# Patient Record
Sex: FEMALE
[De-identification: names, ages, dates, MRNs, and addresses within clinical notes are randomized per-mention and may not be internally consistent; named-entity substitution may affect disease eponyms.]

---

## 2021-09-14 ENCOUNTER — APPOINTMENT (OUTPATIENT)
Dept: ENDOCRINOLOGY | Facility: CLINIC | Age: 41
End: 2021-09-14
Payer: COMMERCIAL

## 2021-09-14 VITALS
HEIGHT: 60 IN | OXYGEN SATURATION: 98 % | SYSTOLIC BLOOD PRESSURE: 94 MMHG | BODY MASS INDEX: 25.32 KG/M2 | DIASTOLIC BLOOD PRESSURE: 52 MMHG | WEIGHT: 129 LBS | HEART RATE: 76 BPM

## 2021-09-14 DIAGNOSIS — Z00.00 ENCOUNTER FOR GENERAL ADULT MEDICAL EXAMINATION W/OUT ABNORMAL FINDINGS: ICD-10-CM

## 2021-09-14 DIAGNOSIS — Z78.9 OTHER SPECIFIED HEALTH STATUS: ICD-10-CM

## 2021-09-14 DIAGNOSIS — Z34.91 ENCOUNTER FOR SUPERVISION OF NORMAL PREGNANCY, UNSPECIFIED, FIRST TRIMESTER: ICD-10-CM

## 2021-09-14 DIAGNOSIS — R94.6 ABNORMAL RESULTS OF THYROID FUNCTION STUDIES: ICD-10-CM

## 2021-09-14 PROCEDURE — 99204 OFFICE O/P NEW MOD 45 MIN: CPT

## 2021-09-20 ENCOUNTER — TRANSCRIPTION ENCOUNTER (OUTPATIENT)
Age: 41
End: 2021-09-20

## 2021-09-26 PROBLEM — Z78.9 NON-SMOKER: Status: RESOLVED | Noted: 2021-09-26 | Resolved: 2021-09-26

## 2021-09-26 RX ORDER — PNV/FERROUS SULFATE/FOLIC ACID 27-<0.5MG
TABLET ORAL
Refills: 0 | Status: ACTIVE | COMMUNITY

## 2021-09-26 NOTE — HISTORY OF PRESENT ILLNESS
[FreeTextEntry1] : Pt here for eval of abnormal TFT\par 11 weeks pregnant \par \par pt has danny ahving some nausea \par \par No h/o thyroid disorder  \par EDC April 5 2022 \par \par \par 1st pregnancy    all ok overall \par \par \par PMh  \par  no asthma  \par NO HTN \par  No PreDM \par \par \par PSH  Csection  \par  \par \par FH no thryoid  disease \par \par \par \par All NKDA \par \par  Soc Hx  born[place] in [place]     No chem NO XRT \par Non smoking No alcohol

## 2021-09-26 NOTE — ASSESSMENT
[FreeTextEntry1] : Abnormal TFT  will check repeat TFT with thryoid AB including TSI \par 11 weeks pregnant \par \par

## 2021-10-08 ENCOUNTER — APPOINTMENT (OUTPATIENT)
Dept: ENDOCRINOLOGY | Facility: CLINIC | Age: 41
End: 2021-10-08
Payer: COMMERCIAL

## 2021-10-08 PROCEDURE — 99213 OFFICE O/P EST LOW 20 MIN: CPT | Mod: 95

## 2021-10-29 NOTE — HISTORY OF PRESENT ILLNESS
[Home] : at home, [unfilled] , at the time of the visit. [Other Location: e.g. Home (Enter Location, City,State)___] : at [unfilled] [Verbal consent obtained from patient] : the patient, [unfilled] [FreeTextEntry1] : TEB   start time 441 pm end time 451 pm \par follow up abnl TFT now normalizinf \par 15 weeks pregnant \par \par pt has danny ahving some nausea \par \par No h/o thyroid disorder  \par EDC April 5 2022 \par \par \par 1st pregnancy    all ok overall \par \par \par PMh  \par  no asthma  \par NO HTN \par  No PreDM \par \par \par PSH  Csection  \par  \par \par FH no thryoid  disease \par \par \par \par All NKDA \par \par  Soc Hx  born[place]     No chem NO XRT \par Non smoking No alcohol

## 2021-10-29 NOTE — ASSESSMENT
[FreeTextEntry1] : Abnormal TFT  will check repeat TFT with thryoid AB including TSI \par 15 weeks pregannt  follow up in 1 month \par

## 2021-11-02 ENCOUNTER — NON-APPOINTMENT (OUTPATIENT)
Age: 41
End: 2021-11-02

## 2021-11-15 ENCOUNTER — APPOINTMENT (OUTPATIENT)
Dept: ENDOCRINOLOGY | Facility: CLINIC | Age: 41
End: 2021-11-15
Payer: COMMERCIAL

## 2021-11-15 PROCEDURE — 99212 OFFICE O/P EST SF 10 MIN: CPT | Mod: 95

## 2021-12-13 NOTE — ASSESSMENT
[FreeTextEntry1] : Abnormal TFT  improved \par  cont to monitor \par  follow up in  4-6 week\par s low B12  add 500 mcg qd  \par \par

## 2021-12-20 ENCOUNTER — APPOINTMENT (OUTPATIENT)
Dept: ENDOCRINOLOGY | Facility: CLINIC | Age: 41
End: 2021-12-20
Payer: COMMERCIAL

## 2021-12-20 PROCEDURE — 99213 OFFICE O/P EST LOW 20 MIN: CPT | Mod: 95

## 2022-01-03 NOTE — ASSESSMENT
[FreeTextEntry1] : Abnormal TFT  improved unitl recent- willrecheck \par  pt asymptomatic  now at 25 weeks\par \par follwoupin 3 weeks\par  for OGTT in 3 weeksa s paty \par  cont to monitor \par  follow up in  4-6 week\par s low B12  add 500 mcg qd  \par \par

## 2022-01-03 NOTE — HISTORY OF PRESENT ILLNESS
[FreeTextEntry1] : TEB   start time 447 pm end time 455 pm \par follow up abnl TFT now normalizinf \par 25 weeks pregnant \par \par pt ahs been overall  ok  less nausea \par \par No h/o thyroid disorder  \par EDC April 5 2022 \par \par \par 1st pregnancy    all ok overall \par \par \par PMh  \par  no asthma  \par NO HTN \par  No PreDM \par \par \par PSH  Csection  \par  \par \par FH no thryoid  disease \par \par \par \par All NKDA \par \par  Soc Hx  born[place]     No chem NO XRT \par Non smoking No alcohol  [Home] : at home, [unfilled] , at the time of the visit. [Medical Office: ([facility])___] : at the medical office located in  [Verbal consent obtained from patient] : the patient, [unfilled]

## 2022-01-25 ENCOUNTER — APPOINTMENT (OUTPATIENT)
Dept: ENDOCRINOLOGY | Facility: CLINIC | Age: 42
End: 2022-01-25
Payer: COMMERCIAL

## 2022-01-25 DIAGNOSIS — E53.8 DEFICIENCY OF OTHER SPECIFIED B GROUP VITAMINS: ICD-10-CM

## 2022-01-25 DIAGNOSIS — O99.013 ANEMIA COMPLICATING PREGNANCY, THIRD TRIMESTER: ICD-10-CM

## 2022-01-25 PROCEDURE — 99213 OFFICE O/P EST LOW 20 MIN: CPT | Mod: 95

## 2022-01-25 NOTE — ASSESSMENT
[FreeTextEntry1] : Abnormal TFT  improved unitl recent- willrecheckin 4 weesk \par  Frret T 4 and Free T3 well within normal limits\par  check Thryoid AB as well \par   \par  pt asymptomatic  now at 25 weeks\par \par  cont to monitor \par  follow up in  4 week \par \par COvid + AB nucleocapsid and spike protein \par  pt had vaccine but also may  have  been exposed [place] \par \par ANmeia- to start iron\par  adding B12 should help \par  low B12  add 500 mcg qd  \par \par

## 2022-01-25 NOTE — HISTORY OF PRESENT ILLNESS
[Other Location: e.g. School (Enter Location, City,State)___] : at [unfilled], at the time of the visit. [Medical Office: ([facility])___] : at the medical office located in  [Verbal consent obtained from patient] : the patient, [unfilled] [FreeTextEntry1] : TEB   start time 1106am end time 1117am \par follow up abnl TFT with residual TSH  slightly lowered \par 30 weeks pregnant \par \par overall feel s well \par passed OGTT \par  told to take iron by OB gyn  -s till has to get this \par \par No h/o thyroid disorder  \par EDC April 5 2022 \par \par \par 1st pregnancy    all ok overall \par \par \par PMh  \par  no asthma  \par NO HTN \par  No PreDM \par \par \par PSH  Csection  \par  \par \par FH no thryoid  disease \par \par \par \par All NKDA \par \par  Soc Hx  born[place]     No chem NO XRT \par Non smoking No alcohol

## 2022-02-25 ENCOUNTER — APPOINTMENT (OUTPATIENT)
Dept: ENDOCRINOLOGY | Facility: CLINIC | Age: 42
End: 2022-02-25
Payer: COMMERCIAL

## 2022-02-25 PROCEDURE — 99213 OFFICE O/P EST LOW 20 MIN: CPT | Mod: 95

## 2022-03-20 NOTE — HISTORY OF PRESENT ILLNESS
[Other Location: e.g. School (Enter Location, City,State)___] : at [unfilled], at the time of the visit. [Medical Office: ([facility])___] : at the medical office located in  [Verbal consent obtained from patient] : the patient, [unfilled] [FreeTextEntry1] : TEB   start time 500 pm am end time 510 pm \par follow up abnl TFT with residual TSH  slightly lowered \par 34 weeks pregnant \par \par overall feel s well \par passed OGTT \par  told to take iron by OB gyn  -s till has to get this \par \par No h/o thyroid disorder  \par EDC April 5 2022 \par \par C section scheduled 3/30 \par \par \par 1st pregnancy    all ok overall \par \par \par PMh  \par  no asthma  \par NO HTN \par  No PreDM \par \par \par PSH  Csection  \par  \par \par FH no thryoid  disease \par \par \par \par All NKDA \par \par  Soc Hx  born[place]     No chem NO XRT \par Non smoking No alcohol

## 2022-05-06 ENCOUNTER — APPOINTMENT (OUTPATIENT)
Dept: ENDOCRINOLOGY | Facility: CLINIC | Age: 42
End: 2022-05-06

## 2022-05-06 PROCEDURE — XXXXX: CPT

## 2022-05-06 NOTE — ASSESSMENT
[FreeTextEntry1] : Abnormal TFT  improved unitl recent- clincially euthryoid\par  Frret T 4 and Free T3 well within normal limits\par \par   \par  pt asymptomatic  now at 34weeks\par \par  cont to monitor \par  follow up inMAy \par COvid + AB nucleocapsid and spike protein \par  pt had vaccine but also may  have  been exposed thru [place] \par \par ANmeia- ton iron \par  low B12  add 500 mcg qd  \par \par

## 2022-05-06 NOTE — HISTORY OF PRESENT ILLNESS
[FreeTextEntry1] : TEB   start time 500 pm am end time 510 pm \par follow up abnl TFT with residual TSH  slightly lowered \par 34 weeks pregnant \par \par overall feel s well \par passed OGTT \par  told to take iron by OB gyn  -s till has to get this \par \par No h/o thyroid disorder  \par EDC April 5 2022 \par \par C section scheduled 3/30 \par \par \par 1st pregnancy    all ok overall \par \par \par PMh  \par  no asthma  \par NO HTN \par  No PreDM \par \par \par PSH  Csection  \par  \par \par FH no thryoid  disease \par \par \par \par All NKDA \par \par  Soc Hx  born[place]     No chem NO XRT \par Non smoking No alcohol  [Other Location: e.g. School (Enter Location, City,State)___] : at [unfilled], at the time of the visit. [Medical Office: ([facility])___] : at the medical office located in  [Verbal consent obtained from patient] : the patient, [unfilled]

## 2023-08-23 ENCOUNTER — OFFICE (OUTPATIENT)
Dept: URBAN - METROPOLITAN AREA CLINIC 113 | Facility: CLINIC | Age: 43
Setting detail: OPHTHALMOLOGY
End: 2023-08-23
Payer: COMMERCIAL

## 2023-08-23 DIAGNOSIS — Z01.00: ICD-10-CM

## 2023-08-23 DIAGNOSIS — H52.13: ICD-10-CM

## 2023-08-23 PROCEDURE — V2520 CONTACT LENS HYDROPHILIC: HCPCS | Performed by: OPTOMETRIST

## 2023-08-23 PROCEDURE — 92014 COMPRE OPH EXAM EST PT 1/>: CPT | Performed by: OPTOMETRIST

## 2023-08-23 PROCEDURE — 92310 CONTACT LENS FITTING OU: CPT | Performed by: OPTOMETRIST

## 2023-08-23 ASSESSMENT — REFRACTION_CURRENTRX
OS_VPRISM_DIRECTION: SV
OD_OVR_VA: 20/
OS_CYLINDER: SPH
OD_VPRISM_DIRECTION: SV
OD_SPHERE: -2.25
OS_SPHERE: -2.75
OS_OVR_VA: 20/
OD_CYLINDER: SPH

## 2023-08-23 ASSESSMENT — CONFRONTATIONAL VISUAL FIELD TEST (CVF)
OS_FINDINGS: FULL
OD_FINDINGS: FULL

## 2023-08-23 ASSESSMENT — REFRACTION_MANIFEST
OD_AXIS: 120
OS_AXIS: 075
OS_CYLINDER: -1.00
OD_VA1: 20/20
OS_SPHERE: -2.25
OD_CYLINDER: -0.50
OS_SPHERE: -2.25
OD_CYLINDER: SPH
OS_AXIS: 110
OD_SPHERE: -2.50
OS_VA1: 20/20
OS_VA1: 20/20
OD_SPHERE: -2.25
OD_VA1: 20/20-2
OS_CYLINDER: -0.50

## 2023-08-23 ASSESSMENT — AXIALLENGTH_DERIVED
OD_AL: 23.4991
OS_AL: 23.5448
OD_AL: 23.7927
OS_AL: 23.8394
OS_AL: 23.7404

## 2023-08-23 ASSESSMENT — KERATOMETRY
OS_K1POWER_DIOPTERS: 45.75
OD_AXISANGLE_DEGREES: 074
OS_AXISANGLE_DEGREES: 090
OD_K2POWER_DIOPTERS: 46.50
OD_K1POWER_DIOPTERS: 45.25
OS_K2POWER_DIOPTERS: 45.75

## 2023-08-23 ASSESSMENT — REFRACTION_AUTOREFRACTION
OD_AXIS: 153
OS_AXIS: 100
OD_SPHERE: -1.75
OS_SPHERE: -1.50
OD_CYLINDER: -0.50
OS_CYLINDER: -1.00

## 2023-08-23 ASSESSMENT — SPHEQUIV_DERIVED
OS_SPHEQUIV: -2.5
OD_SPHEQUIV: -2
OS_SPHEQUIV: -2
OS_SPHEQUIV: -2.75
OD_SPHEQUIV: -2.75

## 2023-08-23 ASSESSMENT — VISUAL ACUITY
OS_BCVA: 20/20-1
OD_BCVA: 20/20

## 2023-08-23 ASSESSMENT — TONOMETRY
OD_IOP_MMHG: 16
OS_IOP_MMHG: 17

## 2024-08-26 ENCOUNTER — OFFICE (OUTPATIENT)
Dept: URBAN - METROPOLITAN AREA CLINIC 113 | Facility: CLINIC | Age: 44
Setting detail: OPHTHALMOLOGY
End: 2024-08-26
Payer: COMMERCIAL

## 2024-08-26 DIAGNOSIS — Z01.00: ICD-10-CM

## 2024-08-26 PROCEDURE — V2520 CONTACT LENS HYDROPHILIC: HCPCS | Performed by: OPTOMETRIST

## 2024-08-26 PROCEDURE — 92310 CONTACT LENS FITTING OU: CPT | Performed by: OPTOMETRIST

## 2024-08-26 PROCEDURE — 92014 COMPRE OPH EXAM EST PT 1/>: CPT | Performed by: OPTOMETRIST

## 2024-08-26 ASSESSMENT — CONFRONTATIONAL VISUAL FIELD TEST (CVF)
OD_FINDINGS: FULL
OS_FINDINGS: FULL